# Patient Record
Sex: FEMALE | Race: WHITE | Employment: PART TIME | URBAN - METROPOLITAN AREA
[De-identification: names, ages, dates, MRNs, and addresses within clinical notes are randomized per-mention and may not be internally consistent; named-entity substitution may affect disease eponyms.]

---

## 2018-05-08 RX ORDER — MELATONIN
1000
COMMUNITY

## 2018-05-08 RX ORDER — TERAZOSIN 2 MG/1
4 CAPSULE ORAL
COMMUNITY

## 2018-05-08 RX ORDER — AMLODIPINE BESYLATE 2.5 MG/1
5 TABLET ORAL
COMMUNITY

## 2018-05-08 RX ORDER — LISINOPRIL AND HYDROCHLOROTHIAZIDE 12.5; 2 MG/1; MG/1
2 TABLET ORAL
COMMUNITY

## 2018-05-09 ENCOUNTER — ANESTHESIA (OUTPATIENT)
Dept: ENDOSCOPY | Age: 68
End: 2018-05-09
Payer: MEDICARE

## 2018-05-09 ENCOUNTER — HOSPITAL ENCOUNTER (OUTPATIENT)
Age: 68
Setting detail: OUTPATIENT SURGERY
Discharge: HOME | End: 2018-05-09
Attending: SPECIALIST | Admitting: SPECIALIST
Payer: MEDICARE

## 2018-05-09 ENCOUNTER — ANESTHESIA EVENT (OUTPATIENT)
Dept: ENDOSCOPY | Age: 68
End: 2018-05-09
Payer: MEDICARE

## 2018-05-09 VITALS
TEMPERATURE: 97.8 F | HEIGHT: 63 IN | BODY MASS INDEX: 24.63 KG/M2 | OXYGEN SATURATION: 100 % | WEIGHT: 139 LBS | DIASTOLIC BLOOD PRESSURE: 69 MMHG | RESPIRATION RATE: 12 BRPM | HEART RATE: 59 BPM | SYSTOLIC BLOOD PRESSURE: 100 MMHG

## 2018-05-09 PROCEDURE — 76060000032 HC ANESTHESIA 0.5 TO 1 HR: Performed by: SPECIALIST

## 2018-05-09 PROCEDURE — 74011000258 HC RX REV CODE- 258

## 2018-05-09 PROCEDURE — 74011250636 HC RX REV CODE- 250/636

## 2018-05-09 PROCEDURE — 77030009426 HC FCPS BIOP ENDOSC BSC -B: Performed by: SPECIALIST

## 2018-05-09 PROCEDURE — 76040000007: Performed by: SPECIALIST

## 2018-05-09 PROCEDURE — 88305 TISSUE EXAM BY PATHOLOGIST: CPT | Performed by: SPECIALIST

## 2018-05-09 PROCEDURE — 77030027957 HC TBNG IRR ENDOGTR BUSS -B: Performed by: SPECIALIST

## 2018-05-09 PROCEDURE — 74011000250 HC RX REV CODE- 250

## 2018-05-09 RX ORDER — FENTANYL CITRATE 50 UG/ML
200 INJECTION, SOLUTION INTRAMUSCULAR; INTRAVENOUS
Status: DISCONTINUED | OUTPATIENT
Start: 2018-05-09 | End: 2018-05-09 | Stop reason: HOSPADM

## 2018-05-09 RX ORDER — LORAZEPAM 2 MG/ML
2 INJECTION INTRAMUSCULAR AS NEEDED
Status: DISCONTINUED | OUTPATIENT
Start: 2018-05-09 | End: 2018-05-09 | Stop reason: HOSPADM

## 2018-05-09 RX ORDER — SODIUM CHLORIDE 0.9 % (FLUSH) 0.9 %
5-10 SYRINGE (ML) INJECTION AS NEEDED
Status: DISCONTINUED | OUTPATIENT
Start: 2018-05-09 | End: 2018-05-09 | Stop reason: HOSPADM

## 2018-05-09 RX ORDER — EPINEPHRINE 0.1 MG/ML
1 INJECTION INTRACARDIAC; INTRAVENOUS
Status: DISCONTINUED | OUTPATIENT
Start: 2018-05-09 | End: 2018-05-09 | Stop reason: HOSPADM

## 2018-05-09 RX ORDER — MIDAZOLAM HYDROCHLORIDE 1 MG/ML
.25-1 INJECTION, SOLUTION INTRAMUSCULAR; INTRAVENOUS
Status: DISCONTINUED | OUTPATIENT
Start: 2018-05-09 | End: 2018-05-09 | Stop reason: HOSPADM

## 2018-05-09 RX ORDER — NALOXONE HYDROCHLORIDE 0.4 MG/ML
0.4 INJECTION, SOLUTION INTRAMUSCULAR; INTRAVENOUS; SUBCUTANEOUS
Status: DISCONTINUED | OUTPATIENT
Start: 2018-05-09 | End: 2018-05-09 | Stop reason: HOSPADM

## 2018-05-09 RX ORDER — LIDOCAINE HYDROCHLORIDE 20 MG/ML
INJECTION, SOLUTION EPIDURAL; INFILTRATION; INTRACAUDAL; PERINEURAL AS NEEDED
Status: DISCONTINUED | OUTPATIENT
Start: 2018-05-09 | End: 2018-05-09 | Stop reason: HOSPADM

## 2018-05-09 RX ORDER — SODIUM CHLORIDE 0.9 % (FLUSH) 0.9 %
5-10 SYRINGE (ML) INJECTION EVERY 8 HOURS
Status: DISCONTINUED | OUTPATIENT
Start: 2018-05-09 | End: 2018-05-09 | Stop reason: HOSPADM

## 2018-05-09 RX ORDER — GLYCOPYRROLATE 0.2 MG/ML
INJECTION INTRAMUSCULAR; INTRAVENOUS AS NEEDED
Status: DISCONTINUED | OUTPATIENT
Start: 2018-05-09 | End: 2018-05-09 | Stop reason: HOSPADM

## 2018-05-09 RX ORDER — DEXTROMETHORPHAN/PSEUDOEPHED 2.5-7.5/.8
1.2 DROPS ORAL
Status: DISCONTINUED | OUTPATIENT
Start: 2018-05-09 | End: 2018-05-09 | Stop reason: HOSPADM

## 2018-05-09 RX ORDER — SODIUM CHLORIDE 9 MG/ML
INJECTION, SOLUTION INTRAVENOUS
Status: DISCONTINUED | OUTPATIENT
Start: 2018-05-09 | End: 2018-05-09 | Stop reason: HOSPADM

## 2018-05-09 RX ORDER — FLUMAZENIL 0.1 MG/ML
0.2 INJECTION INTRAVENOUS
Status: DISCONTINUED | OUTPATIENT
Start: 2018-05-09 | End: 2018-05-09 | Stop reason: HOSPADM

## 2018-05-09 RX ORDER — SODIUM CHLORIDE 9 MG/ML
50 INJECTION, SOLUTION INTRAVENOUS CONTINUOUS
Status: DISCONTINUED | OUTPATIENT
Start: 2018-05-09 | End: 2018-05-09 | Stop reason: HOSPADM

## 2018-05-09 RX ORDER — ATROPINE SULFATE 0.1 MG/ML
0.5 INJECTION INTRAVENOUS
Status: DISCONTINUED | OUTPATIENT
Start: 2018-05-09 | End: 2018-05-09 | Stop reason: HOSPADM

## 2018-05-09 RX ORDER — PROPOFOL 10 MG/ML
INJECTION, EMULSION INTRAVENOUS AS NEEDED
Status: DISCONTINUED | OUTPATIENT
Start: 2018-05-09 | End: 2018-05-09 | Stop reason: HOSPADM

## 2018-05-09 RX ADMIN — PROPOFOL 25 MG: 10 INJECTION, EMULSION INTRAVENOUS at 10:00

## 2018-05-09 RX ADMIN — PROPOFOL 25 MG: 10 INJECTION, EMULSION INTRAVENOUS at 09:56

## 2018-05-09 RX ADMIN — GLYCOPYRROLATE 0.2 MG: 0.2 INJECTION INTRAMUSCULAR; INTRAVENOUS at 09:45

## 2018-05-09 RX ADMIN — PROPOFOL 100 MG: 10 INJECTION, EMULSION INTRAVENOUS at 09:43

## 2018-05-09 RX ADMIN — PROPOFOL 25 MG: 10 INJECTION, EMULSION INTRAVENOUS at 09:44

## 2018-05-09 RX ADMIN — SODIUM CHLORIDE: 9 INJECTION, SOLUTION INTRAVENOUS at 09:38

## 2018-05-09 RX ADMIN — PROPOFOL 25 MG: 10 INJECTION, EMULSION INTRAVENOUS at 09:50

## 2018-05-09 RX ADMIN — PROPOFOL 25 MG: 10 INJECTION, EMULSION INTRAVENOUS at 10:05

## 2018-05-09 RX ADMIN — LIDOCAINE HYDROCHLORIDE 100 MG: 20 INJECTION, SOLUTION EPIDURAL; INFILTRATION; INTRACAUDAL; PERINEURAL at 09:45

## 2018-05-09 RX ADMIN — PROPOFOL 25 MG: 10 INJECTION, EMULSION INTRAVENOUS at 09:53

## 2018-05-09 NOTE — ROUTINE PROCESS
Daniel Henderson  1950  [id number]    Situation:  Verbal report received from: Ashanti Duarte RN  Procedure: Procedure(s):  COLONOSCOPY  ENDOSCOPIC POLYPECTOMY    Background:    Preoperative diagnosis: SCREENING  Postoperative diagnosis: 1.- Mild Diverticulosis  2.- Colon Polyps    :  Dr. Cristian Logan  Assistant(s): Endoscopy Technician-1: Ken Bhagat  Endoscopy RN-1: Jeanne Pedersen RN    Specimens:   ID Type Source Tests Collected by Time Destination   1 : Rectal Polyp Preservative   Mode Shepherd MD 5/9/2018 6926 Pathology   2 : Sigmoid Polyp Preservative   Mode Shepherd MD 5/9/2018 1007 Pathology     H. Pylori  no    Assessment:  Intra-procedure medications       Anesthesia gave intra-procedure sedation and medications, see anesthesia flow sheet yes    Intravenous fluids: NS@ KVO     Vital signs stable     Abdominal assessment: round and soft     Recommendation:  Discharge patient per MD order  Family or Friend Pt   Permission to share finding with family or friend yes

## 2018-05-09 NOTE — IP AVS SNAPSHOT
[address] 
[phone] Patient: Leatha Us MRN: [id number] About your hospitalization You were admitted on:  May 9, 2018 You last received care in the:  [facility] ENDOSCOPY You were discharged on:  May 9, 2018 Why you were hospitalized Your primary diagnosis was:  Not on File Follow-up Information None Discharge Orders None A check yg indicates which time of day the medication should be taken. My Medications CONTINUE taking these medications Instructions Each Dose to Equal  
 Morning Noon Evening Bedtime  
 amLODIPine 2.5 mg tablet Commonly known as:  Thomas Dumas Your last dose was: Your next dose is: Take 5 mg by mouth nightly. 5 mg  
    
   
   
   
  
 lisinopril-hydroCHLOROthiazide 20-12.5 mg per tablet Commonly known as:  Aida Dun Your last dose was: Your next dose is: Take 2 Tabs by mouth nightly. 2 Tab  
    
   
   
   
  
 terazosin 2 mg capsule Commonly known as:  HYTRIN Your last dose was: Your next dose is: Take 4 mg by mouth nightly. 4 mg VITAMIN D3 1,000 unit tablet Generic drug:  cholecalciferol Your last dose was: Your next dose is: Take 1,000 Units by mouth nightly. 1000 Units Discharge Instructions Leatha Us 
[id number] 
1950 COLON DISCHARGE INSTRUCTIONS Discomfort: 
Redness at IV site- apply warm compress to area; if redness or soreness persist- contact your physician There may be a slight amount of blood passed from the rectum Gaseous discomfort- walking, belching will help relieve any discomfort You may not operate a vehicle for 12 hours You may not engage in an occupation involving machinery or appliances for rest of today You may not drink alcoholic beverages for at least 12 hours Avoid making any critical decisions for at least 24 hour DIET: 
 Regular diet.  however -  remember your colon is empty and a heavy meal will produce gas. Avoid these foods:  vegetables, fried / greasy foods, carbonated drinks for today. ACTIVITY: 
You may resume your normal daily activities it is recommended that you spend the remainder of the day resting -  avoid any strenuous activity. CALL M.D. ANY SIGN OF: Increasing pain, nausea, vomiting Abdominal distension (swelling) New increased bleeding (oral or rectal) Fever (chills) Pain in chest area Bloody discharge from nose or mouth Shortness of breath You may not  take any Advil, Aspirin, Ibuprofen, Motrin, Aleve, Goodys, or any similar pain or arthritis products for 3 days, ONLY  Tylenol as needed for pain. Follow-up Instructions: 
 Call Dr. Gilbert Snow Results of procedure / biopsy in 10-14days Office telephone for problems or questions [phone] 
 
  - If adenoma is present, repeat colonoscopy in 5 years. - If < 10 years, reason: above average risk patient Introducing [facility]! Adelina Camejo introduces [employer] patient portal. Now you can access parts of your medical record, email your doctor's office, and request medication refills online. 1. In your internet browser, go to [email or web address] 2. Click on the First Time User? Click Here link in the Sign In box. You will see the New Member Sign Up page. 3. Enter your [employer] Access Code exactly as it appears below. You will not need to use this code after youve completed the sign-up process. If you do not sign up before the expiration date, you must request a new code. · [employer] Access Code: [email or web address] Expires: 8/6/2018  4:41 PM 
 
4.  Enter the last four digits of your Social Security Number (xxxx) and Date of Birth (mm/dd/yyyy) as indicated and click Submit. You will be taken to the next sign-up page. 5. Create a [employer] ID. This will be your Vo[employer] login ID and cannot be changed, so think of one that is secure and easy to remember. 6. Create a [employer] password. You can change your password at any time. 7. Enter your Password Reset Question and Answer. This can be used at a later time if you forget your password. 8. Enter your e-mail address. You will receive e-mail notification when new information is available in [address] 9. Click Sign Up. You can now view and download portions of your medical record. 10. Click the Download Summary menu link to download a portable copy of your medical information. If you have questions, please visit the Frequently Asked Questions section of the Vo[employer] website. Remember, Voices is NOT to be used for urgent needs. For medical emergencies, dial 911. Now available from your iPhone and Android! Introducing Joseph Braden As a [employer] patient, I wanted to make you aware of our electronic visit tool called Joseph Braden. [employer] 24/7 allows you to connect within minutes with a medical provider 24 hours a day, seven days a week via a mobile device or tablet or logging into a secure website from your computer. You can access Joseph Braden from anywhere in the [place]. A virtual visit might be right for you when you have a simple condition and feel like you just dont want to get out of bed, or cant get away from work for an appointment, when your regular [employer] provider is not available (evenings, weekends or holidays), or when youre out of town and need minor care. Electronic visits cost only $49 and if the [employer] 24/7 provider determines a prescription is needed to treat your condition, one can be electronically transmitted to a nearby pharmacy*. Please take a moment to enroll today if you have not already done so. The enrollment process is free and takes just a few minutes. To enroll, please download the [employer] 24/7 estephanie to your tablet or phone, or visit [email or web address] to enroll on your computer. And, as an [address] [place] [address] patient with a [employer] account, the results of your visits will be scanned into your electronic medical record and your primary care provider will be able to view the scanned results. We urge you to continue to see your regular [employer] provider for your ongoing medical care. And while your primary care provider may not be the one available when you seek a [employer] virtual visit, the peace of mind you get from getting a real diagnosis real time can be priceless. For more information on [employer], view our Frequently Asked Questions (FAQs) at [email or web address]. Sincerely, 
 
Mike De Jesus MD 
Chief Medical Officer [facility] Whitney Mcbride *:  certain medications cannot be prescribed via [employer] Providers Seen During Your Hospitalization Provider Specialty Primary office phone Adama Trammell MD Gastroenterology [phone] Your Primary Care Physician (PCP) Primary Care Physician Office Phone Office Fax Tata Lucia [phone] You are allergic to the following No active allergies Recent Documentation Height Weight BMI OB Status Smoking Status 1.6 m 63 kg 24.62 kg/m2 Postmenopausal Never Smoker Emergency Contacts Name Discharge Info Relation Home Work Mobile Ki Potts DISCHARGE CAREGIVER [3] Spouse [3] [phone] Patient Belongings The following personal items are in your possession at time of discharge: 
  Dental Appliances: None Please provide this summary of care documentation to your next provider. Signatures-by signing, you are acknowledging that this After Visit Summary has been reviewed with you and you have received a copy. Patient Signature:  ____________________________________________________________ Date:  ____________________________________________________________  
  
Larri Hazard Provider Signature:  ____________________________________________________________ Date:  ____________________________________________________________

## 2018-05-09 NOTE — H&P
Pre-endoscopy H and P for Colonoscopy    The patient was seen and examined. Date of last colonoscopy: 2007, Polyps  No      The airway was assessed and documented. The problem list, past medical history, and medications were reviewed. There is no problem list on file for this patient. Social History     Social History    Marital status:      Spouse name: N/A    Number of children: N/A    Years of education: N/A     Occupational History    Not on file. Social History Main Topics    Smoking status: Never Smoker    Smokeless tobacco: Never Used    Alcohol use Yes      Comment: minimal    Drug use: Not on file    Sexual activity: Not on file     Other Topics Concern    Not on file     Social History Narrative    No narrative on file     Past Medical History:   Diagnosis Date    Hypertension      The patient has a family history of na    Cannot display prior to admission medications because the patient has not been admitted in this contact. The review of systems is:  negative for shortness of breath or chest pain      The heart, lungs and mental status were satisfactory for the administration of MAC sedation and for the procedure. Mallampati score: See Anesthesia. I discussed with the patient the objectives, risks, consequences and alternatives to the procedure. Plan: Endoscopic procedure with MAC sedation.     Jordyn Molina MD  5/9/2018  8:45 AM

## 2018-05-09 NOTE — PROCEDURES
Colonoscopy Procedure Note    Indications:   Screening colonoscopy  Referring Physician: Monika Harris MD   Anesthesia/Sedation:MAC  Endoscopist:  Dr. Janette Fall  Assistant:  Endoscopy Technician-1: Radha Coyle  Endoscopy RN-1: Mirella Fisher RN    Preoperative diagnosis: SCREENING    Postoperative diagnosis: 1.- Mild Diverticulosis  2.- Colon Polyps      Procedure in Detail:  Informed consent was obtained for the procedure, including sedation. Risks of perforation, hemorrhage, adverse drug reaction, and aspiration were discussed. The patient was placed in the left lateral decubitus position. Based on the pre-procedure assessment, including review of the patient's medical history, medications, allergies, and review of systems, she had been deemed to be an appropriate candidate for moderate sedation; she was therefore sedated with the medications listed above. The patient was monitored continuously with ECG tracing, pulse oximetry, blood pressure monitoring, and direct observations. A rectal examination was performed. The [id number] was inserted into the rectum and advanced under direct vision to the terminal ileum. The quality of the colonic preparation was excellent. A careful inspection was made as the colonoscope was withdrawn, including a retroflexed view of the rectum; findings and interventions are described below. Findings:   Rectum: 2 mm polyp removed with cold forceps  Sigmoid: moderate diverticulosis; 2 mm polyp removed with cold forceps  Descending Colon: mild diverticulosis;  Transverse Colon: mild diverticulosis; Ascending Colon: normal  Cecum: normal  Terminal Ileum: normal    Specimens:     see above    EBL: None    Complications: None; patient tolerated the procedure well. Recommendations:     - If adenoma is present, repeat colonoscopy in 5 years.      - If < 10 years, reason: above average risk patient    Signed By: Belkis Rogers MD May 9, 2018

## 2018-05-09 NOTE — DISCHARGE INSTRUCTIONS
Patrice Spaulding  [id number]  1950    COLON DISCHARGE INSTRUCTIONS  Discomfort:  Redness at IV site- apply warm compress to area; if redness or soreness persist- contact your physician  There may be a slight amount of blood passed from the rectum  Gaseous discomfort- walking, belching will help relieve any discomfort  You may not operate a vehicle for 12 hours  You may not engage in an occupation involving machinery or appliances for rest of today  You may not drink alcoholic beverages for at least 12 hours  Avoid making any critical decisions for at least 24 hour  DIET:   Regular diet. - however -  remember your colon is empty and a heavy meal will produce gas. Avoid these foods:  vegetables, fried / greasy foods, carbonated drinks for today. ACTIVITY:  You may resume your normal daily activities it is recommended that you spend the remainder of the day resting -  avoid any strenuous activity. CALL M.D. ANY SIGN OF:  Increasing pain, nausea, vomiting  Abdominal distension (swelling)  New increased bleeding (oral or rectal)  Fever (chills)  Pain in chest area  Bloody discharge from nose or mouth  Shortness of breath    You may not  take any Advil, Aspirin, Ibuprofen, Motrin, Aleve, Goodys, or any similar pain or arthritis products for 3 days, ONLY  Tylenol as needed for pain. Follow-up Instructions:   Call Dr. Ayde Brown  Results of procedure / biopsy in 10-14days   Office telephone for problems or questions [phone]      - If adenoma is present, repeat colonoscopy in 5 years.      - If < 10 years, reason: above average risk patient

## 2018-05-09 NOTE — PROGRESS NOTES

## 2021-07-23 ENCOUNTER — HOSPITAL ENCOUNTER (OUTPATIENT)
Age: 71
Setting detail: OUTPATIENT SURGERY
Discharge: HOME | End: 2021-07-23
Attending: INTERNAL MEDICINE | Admitting: INTERNAL MEDICINE
Payer: MEDICARE

## 2021-07-23 ENCOUNTER — ANESTHESIA EVENT (OUTPATIENT)
Dept: ENDOSCOPY | Age: 71
End: 2021-07-23
Payer: MEDICARE

## 2021-07-23 ENCOUNTER — ANESTHESIA (OUTPATIENT)
Dept: ENDOSCOPY | Age: 71
End: 2021-07-23
Payer: MEDICARE

## 2021-07-23 VITALS
DIASTOLIC BLOOD PRESSURE: 120 MMHG | BODY MASS INDEX: 24.8 KG/M2 | OXYGEN SATURATION: 93 % | WEIGHT: 140 LBS | RESPIRATION RATE: 18 BRPM | HEIGHT: 63 IN | HEART RATE: 67 BPM | TEMPERATURE: 96.9 F | SYSTOLIC BLOOD PRESSURE: 144 MMHG

## 2021-07-23 PROCEDURE — 77030018712 HC DEV BLLN INFL BSC -B: Performed by: INTERNAL MEDICINE

## 2021-07-23 PROCEDURE — 2709999900 HC NON-CHARGEABLE SUPPLY: Performed by: INTERNAL MEDICINE

## 2021-07-23 PROCEDURE — 74011250636 HC RX REV CODE- 250/636: Performed by: NURSE ANESTHETIST, CERTIFIED REGISTERED

## 2021-07-23 PROCEDURE — C1726 CATH, BAL DIL, NON-VASCULAR: HCPCS | Performed by: INTERNAL MEDICINE

## 2021-07-23 PROCEDURE — 76040000019: Performed by: INTERNAL MEDICINE

## 2021-07-23 PROCEDURE — 76060000031 HC ANESTHESIA FIRST 0.5 HR: Performed by: INTERNAL MEDICINE

## 2021-07-23 PROCEDURE — 74011000250 HC RX REV CODE- 250: Performed by: NURSE ANESTHETIST, CERTIFIED REGISTERED

## 2021-07-23 RX ORDER — FLUMAZENIL 0.1 MG/ML
0.2 INJECTION INTRAVENOUS
Status: DISCONTINUED | OUTPATIENT
Start: 2021-07-23 | End: 2021-07-23 | Stop reason: HOSPADM

## 2021-07-23 RX ORDER — ATROPINE SULFATE 0.1 MG/ML
0.5 INJECTION INTRAVENOUS
Status: DISCONTINUED | OUTPATIENT
Start: 2021-07-23 | End: 2021-07-23 | Stop reason: HOSPADM

## 2021-07-23 RX ORDER — EPINEPHRINE 0.1 MG/ML
1 INJECTION INTRACARDIAC; INTRAVENOUS
Status: DISCONTINUED | OUTPATIENT
Start: 2021-07-23 | End: 2021-07-23 | Stop reason: HOSPADM

## 2021-07-23 RX ORDER — PROPOFOL 10 MG/ML
INJECTION, EMULSION INTRAVENOUS AS NEEDED
Status: DISCONTINUED | OUTPATIENT
Start: 2021-07-23 | End: 2021-07-23 | Stop reason: HOSPADM

## 2021-07-23 RX ORDER — FENTANYL CITRATE 50 UG/ML
100 INJECTION, SOLUTION INTRAMUSCULAR; INTRAVENOUS
Status: DISCONTINUED | OUTPATIENT
Start: 2021-07-23 | End: 2021-07-23 | Stop reason: HOSPADM

## 2021-07-23 RX ORDER — MIDAZOLAM HYDROCHLORIDE 1 MG/ML
.25-1 INJECTION, SOLUTION INTRAMUSCULAR; INTRAVENOUS
Status: DISCONTINUED | OUTPATIENT
Start: 2021-07-23 | End: 2021-07-23 | Stop reason: HOSPADM

## 2021-07-23 RX ORDER — OMEPRAZOLE 20 MG/1
20 CAPSULE, DELAYED RELEASE ORAL DAILY
COMMUNITY

## 2021-07-23 RX ORDER — SODIUM CHLORIDE 0.9 % (FLUSH) 0.9 %
5-40 SYRINGE (ML) INJECTION EVERY 8 HOURS
Status: DISCONTINUED | OUTPATIENT
Start: 2021-07-23 | End: 2021-07-23 | Stop reason: HOSPADM

## 2021-07-23 RX ORDER — SODIUM CHLORIDE 9 MG/ML
INJECTION, SOLUTION INTRAVENOUS
Status: DISCONTINUED | OUTPATIENT
Start: 2021-07-23 | End: 2021-07-23 | Stop reason: HOSPADM

## 2021-07-23 RX ORDER — SODIUM CHLORIDE 9 MG/ML
50 INJECTION, SOLUTION INTRAVENOUS CONTINUOUS
Status: DISCONTINUED | OUTPATIENT
Start: 2021-07-23 | End: 2021-07-23 | Stop reason: HOSPADM

## 2021-07-23 RX ORDER — LIDOCAINE HYDROCHLORIDE 20 MG/ML
INJECTION, SOLUTION EPIDURAL; INFILTRATION; INTRACAUDAL; PERINEURAL AS NEEDED
Status: DISCONTINUED | OUTPATIENT
Start: 2021-07-23 | End: 2021-07-23 | Stop reason: HOSPADM

## 2021-07-23 RX ORDER — DEXTROMETHORPHAN/PSEUDOEPHED 2.5-7.5/.8
1.2 DROPS ORAL
Status: DISCONTINUED | OUTPATIENT
Start: 2021-07-23 | End: 2021-07-23 | Stop reason: HOSPADM

## 2021-07-23 RX ORDER — SODIUM CHLORIDE 0.9 % (FLUSH) 0.9 %
5-40 SYRINGE (ML) INJECTION AS NEEDED
Status: DISCONTINUED | OUTPATIENT
Start: 2021-07-23 | End: 2021-07-23 | Stop reason: HOSPADM

## 2021-07-23 RX ORDER — NALOXONE HYDROCHLORIDE 0.4 MG/ML
0.4 INJECTION, SOLUTION INTRAMUSCULAR; INTRAVENOUS; SUBCUTANEOUS
Status: DISCONTINUED | OUTPATIENT
Start: 2021-07-23 | End: 2021-07-23 | Stop reason: HOSPADM

## 2021-07-23 RX ADMIN — LIDOCAINE HYDROCHLORIDE 40 MG: 20 INJECTION, SOLUTION EPIDURAL; INFILTRATION; INTRACAUDAL; PERINEURAL at 15:28

## 2021-07-23 RX ADMIN — PROPOFOL 50 MG: 10 INJECTION, EMULSION INTRAVENOUS at 15:32

## 2021-07-23 RX ADMIN — PROPOFOL 50 MG: 10 INJECTION, EMULSION INTRAVENOUS at 15:28

## 2021-07-23 RX ADMIN — SODIUM CHLORIDE: 900 INJECTION, SOLUTION INTRAVENOUS at 15:15

## 2021-07-23 RX ADMIN — PROPOFOL 30 MG: 10 INJECTION, EMULSION INTRAVENOUS at 15:34

## 2021-07-23 RX ADMIN — PROPOFOL 50 MG: 10 INJECTION, EMULSION INTRAVENOUS at 15:29

## 2021-07-23 NOTE — PROCEDURES
[address]  [address]  [place], [address]  [phone]                            NAME:  Mel Aranda   :   1950   MRN:   [id number]     Date/Time:  2021 3:44 PM    Esophagogastroduodenoscopy (EGD) Procedure Note    :  Anisha Summers MD    Staff: Endoscopy Technician-1: [facility]  Endoscopy RN-1: Roger Gomez RN    Referring Provider:  Eulis Schilder., MD    Anethesia/Sedation:  MAC anesthesia    Procedure Details   After infomed consent was obtained for the procedure, with all risks and benefits of procedure explained the patient was taken to the endoscopy suite and placed in the left lateral decubitus position. Following sequential administration of sedation as per above, the gastroscope was inserted into the mouth and advanced under direct vision to second portion of the duodenum. A careful inspection was made as the gastroscope was withdrawn, including a retroflexed view of the proximal stomach; findings and interventions are described below. Findings:  Esophagus:normal esophageal mucosa, non-obstructing Shatzki's ring at GE junction 36 cm from the incisors, dilated 16.5 mm-18 mm with balloon dilator. 3 cm sliding hiatal hernia  Stomach:normal   Duodenum/jejunum:normal    Interventions:  Dilation            Specimens Removed:  * No specimens in log *    Complications: None. EBL:  Minimal     Impression:    See Postoperative diagnosis above    Recommendations:   - Resume normal medications. Continue omeprazole 20 mg once daily.      Discharge disposition:  Home in the company of  when able to ambulate    Anisha Summers MD

## 2021-07-23 NOTE — H&P
[address]  [address] [facility] [address] [place], [address]  [phone]                                History and Physical     NAME: Stella Finney   :  1950   MRN:  [id number]     HPI:  The patient was seen and examined. Past Surgical History:   Procedure Laterality Date    COLONOSCOPY N/A 2018    COLONOSCOPY performed by Lucía Jones MD at [facility] ENDOSCOPY    HX MASTECTOMY      bilateral - not cancerous - done d/t family hx    HX ORTHOPAEDIC      surgery for broken left ankle     Past Medical History:   Diagnosis Date    Hypertension      Social History     Tobacco Use    Smoking status: Never Smoker    Smokeless tobacco: Never Used   Vaping Use    Vaping Use: Never used   Substance Use Topics    Alcohol use: Yes     Comment: minimal    Drug use: Never     No Known Allergies  Family History   Problem Relation Age of Onset    Breast Cancer Mother     Breast Cancer Maternal Aunt     Breast Cancer Maternal Grandmother     Breast Cancer Other         first cousins x 1     No current facility-administered medications for this encounter. PHYSICAL EXAM:  General: WD, WN. Alert, cooperative, no acute distress    HEENT: NC, Atraumatic. PERRLA, EOMI. Anicteric sclerae. Lungs:  CTA Bilaterally. No Wheezing/Rhonchi/Rales. Heart:  Regular  rhythm,  No murmur, No Rubs, No Gallops  Abdomen: Soft, Non distended, Non tender. +Bowel sounds, no HSM  Extremities: No c/c/e  Neurologic:  CN 2-12 gi, Alert and oriented X 3. No acute neurological distress   Psych:   Good insight. Not anxious nor agitated. The heart, lungs and mental status were satisfactory for the administration of MAC sedation and for the procedure. Mallampati score: 2     The patient was counseled at length about the risks of aure Covid-19 in the yannick-operative and post-operative states including the recovery window of their procedure.   The patient was made aware that aure Covid-19 after a surgical procedure may worsen their prognosis for recovering from the virus and lend to a higher morbidity and or mortality risk. The patient was given the options of postponing their procedure. All of the risks, benefits, and alternatives were discussed. The patient does wish to proceed with the procedure.       Assessment:   · dysphagia    Plan:   · Endoscopic procedure :egd  · MAC sedation

## 2021-07-23 NOTE — DISCHARGE INSTRUCTIONS
[address] [facility].  [address]  [id number]  1950    It was my pleasure seeing you for your procedure. You will also receive a summary report with the findings from this procedure and any further recommendations. If you had polyps removed or biopsies taken during your procedure, you will receive a separate letter from me within the next 2 weeks. If you don't receive this letter or if you have any questions, please call my office [phone]. Please take note of the post procedure instructions listed below. Braulio Lee,    Dr. Randy Araujo    These instructions give you information on caring for yourself after your procedure. Call your doctor if you have any problems or questions after your procedure. HOME CARE  · Walk if you have belly cramping or gas. Walking will help get rid of the air and reduce the bloated feeling in your belly (abdomen). · Your IV site (where you received drugs) may be tender to touch. Place warm towels on the site; keep your arm up on two pillows if you have any swelling or soreness in the area. · You may shower. ACTIVITY:  · Take frequent rest periods and move at a slower pace for the next 24 hours. .  · You may resume your regular activity tomorrow if you are feeling back to normal.  · Do not drive or ride a bicycle for at least 24 hours (because of the medicine (anesthesia) used during the test). · Do not sign any important legal documents or use or operate any machinery for 24 hours  · Do not take sleeping medicines/nerve drugs for 24 hours unless the doctor tells you. · You can return to work/school tomorrow unless otherwise instructed. NUTRITION:  · Drink plenty of fluids to keep your pee (urine) clear or pale yellow  · Begin with a light meal and progress to your normal diet.  Heavy or fried foods are harder to digest and may make you feel sick to your stomach (nauseated). · Once you are feeling back to normal, you may resume your normal diet as instructed by your doctor. · Avoid alcoholic beverages for 24 hours or as instructed. IF YOU HAD BIOPSIES TAKEN OR POLYPS REMOVED DURING THE PROCEDURE:  · For the next 7 days, avoid all non-steroidal antiinflammatory medications such as Ibuprofen, Motrin, Advil, Alleve, Belinda-seltzer, Goody's powder, BC powder. · If you do not have an heart condition that requires you to take a daily aspirin, you should avoid taking aspirin for 7 days. · Eat a soft diet for 24 hours. · Monitor your stools for any blood or dark black, tar-like, stools as this may be a sign of bleeding and if you see any blood, notify your doctor immediately. GET HELP RIGHT AWAY AND SEEK IMMEDIATE MEDICAL CARE IF:  · You have more than a spotting of blood in your stool. · You pass clumps of tissue (blood clots) or fill the toilet with blood. · Your belly is painfully swollen or puffy (abdominal distention). · You throw up (vomit). · You have a fever. · You have redness, pain or swelling at the IV site that last greater than two days. · You have abdominal pain or discomfort that is severe or gets worse throughout the day. Post-procedure diagnosis:  Hiatal Hernia   Schatzki's Ring    Post-procedure recommendations:    below. Findings:  Esophagus:normal esophageal mucosa, non-obstructing Shatzki's ring at GE junction 36 cm from the incisors, dilated 16.5 mm-18 mm with balloon dilator. 3 cm sliding hiatal hernia  Stomach:normal   Duodenum/jejunum:normal    Recommendations:   - Resume normal medications. Continue omeprazole 20 mg once daily. Learning About Coronavirus [phone])  Coronavirus [phone]): Overview  What is coronavirus (COVID-19)? The coronavirus disease (COVID-19) is caused by a virus. It is an illness that was first found in [place], [place], in December 2019.  It has since spread worldwide. The virus can cause fever, cough, and trouble breathing. In severe cases, it can cause pneumonia and make it hard to breathe without help. It can cause death. Coronaviruses are a large group of viruses. They cause the common cold. They also cause more serious illnesses like Middle East respiratory syndrome (MERS) and severe acute respiratory syndrome (SARS). COVID-19 is caused by a novel coronavirus. That means it's a new type that has not been seen in people before. This virus spreads person-to-person through droplets from coughing and sneezing. It can also spread when you are close to someone who is infected. And it can spread when you touch something that has the virus on it, such as a doorknob or a tabletop. What can you do to protect yourself from coronavirus (COVID-19)? The best way to protect yourself from getting sick is to:  · Avoid areas where there is an outbreak. · Avoid contact with people who may be infected. · Wash your hands often with soap or alcohol-based hand sanitizers. · Avoid crowds and try to stay at least 6 feet away from other people. · Wash your hands often, especially after you cough or sneeze. Use soap and water, and scrub for at least 20 seconds. If soap and water aren't available, use an alcohol-based hand . · Avoid touching your mouth, nose, and eyes. What can you do to avoid spreading the virus to others? To help avoid spreading the virus to others:  · Cover your mouth with a tissue when you cough or sneeze. Then throw the tissue in the trash. · Use a disinfectant to clean things that you touch often. · Stay home if you are sick or have been exposed to the virus. Don't go to school, work, or public areas. And don't use public transportation. · If you are sick:  ? Leave your home only if you need to get medical care. But call the doctor's office first so they know you're coming.  And wear a face mask, if you have one.  ? If you have a face mask, wear it whenever you're around other people. It can help stop the spread of the virus when you cough or sneeze. ? Clean and disinfect your home every day. Use household  and disinfectant wipes or sprays. Take special care to clean things that you grab with your hands. These include doorknobs, remote controls, phones, and handles on your refrigerator and microwave. And don't forget countertops, tabletops, bathrooms, and computer keyboards. When to call for help  Call 911 anytime you think you may need emergency care. For example, call if:  · You have severe trouble breathing. (You can't talk at all.)  · You have constant chest pain or pressure. · You are severely dizzy or lightheaded. · You are confused or can't think clearly. · Your face and lips have a blue color. · You pass out (lose consciousness) or are very hard to wake up. Call your doctor now if you develop symptoms such as:  · Shortness of breath. · Fever. · Cough. If you need to get care, call ahead to the doctor's office for instructions before you go. Make sure you wear a face mask, if you have one, to prevent exposing other people to the virus. Where can you get the latest information? The following health organizations are tracking and studying this virus. Their websites contain the most up-to-date information. Jennifer Adrian also learn what to do if you think you may have been exposed to the virus. · [place]. Centers for Disease Control and Prevention (CDC): The CDC provides updated news about the disease and travel advice. The website also tells you how to prevent the spread of infection. www.cdc.gov  · World Health Organization [facility]): WHO offers information about the virus outbreaks. WHO also has travel advice. www.who.int  Current as of: April 1, 2020               Content Version: 12.4  © 8468-3807 [employer].    Care instructions adapted under license by your healthcare professional. If you have questions about a medical condition or this instruction, always ask your healthcare professional. [place] [address] any warranty or liability for your use of this information.

## 2021-07-23 NOTE — ANESTHESIA POSTPROCEDURE EVALUATION
Procedure(s):  UPPER ENDOSCOPY  ESOPHAGEAL DILATION. MAC    <BSHSIANPOST>    INITIAL Post-op Vital signs: No vitals data found for the desired time range.

## 2021-07-23 NOTE — ANESTHESIA PREPROCEDURE EVALUATION
Relevant Problems   No relevant active problems       Anesthetic History               Review of Systems / Medical History      Pulmonary                   Neuro/Psych              Cardiovascular    Hypertension                   GI/Hepatic/Renal                Endo/Other             Other Findings              Physical Exam    Airway  Mallampati: II           Cardiovascular               Dental         Pulmonary                 Abdominal         Other Findings            Anesthetic Plan    ASA: 2  Anesthesia type: MAC            Anesthetic plan and risks discussed with: Patient

## 2024-04-10 ENCOUNTER — HOSPITAL ENCOUNTER (OUTPATIENT)
Facility: HOSPITAL | Age: 74
Discharge: HOME | End: 2024-04-13

## 2024-04-10 DIAGNOSIS — E78.5 HYPERLIPIDEMIA LDL GOAL <100: ICD-10-CM

## 2024-04-10 DIAGNOSIS — M85.80 OTHER SPECIFIED DISORDERS OF BONE DENSITY AND STRUCTURE, UNSPECIFIED SITE: ICD-10-CM

## 2024-04-10 PROCEDURE — 75571 CT HRT W/O DYE W/CA TEST: CPT

## 2024-04-10 PROCEDURE — 77080 DXA BONE DENSITY AXIAL: CPT

## (undated) DEVICE — ESOPHAGEAL BALLOON DILATATION CATHETER: Brand: CRE FIXED WIRE

## (undated) DEVICE — FORCEPS BX L240CM JAW DIA2.8MM L CAP W/ NDL MIC MESH TOOTH

## (undated) DEVICE — QUILTED PREMIUM COMFORT UNDERPAD,EXTRA HEAVY: Brand: WINGS

## (undated) DEVICE — KENDALL RADIOLUCENT FOAM MONITORING ELECTRODE -RECTANGULAR SHAPE: Brand: KENDALL

## (undated) DEVICE — SYRINGE MED 20ML STD CLR PLAS LUERLOCK TIP N CTRL DISP

## (undated) DEVICE — AIRLIFE™ U/CONNECT-IT OXYGEN TUBING 7 FEET (2.1 M) CRUSH-RESISTANT OXYGEN TUBING, VINYL TIPPED: Brand: AIRLIFE™

## (undated) DEVICE — SYR ASSEMB INFL BLLN 60ML --

## (undated) DEVICE — BAG BELONG PT PERS CLEAR HANDL

## (undated) DEVICE — CATH IV AUTOGRD BC BLU 22GA 25 -- INSYTE

## (undated) DEVICE — Device

## (undated) DEVICE — SOLIDIFIER FLUID 3000 CC ABSORB

## (undated) DEVICE — CANN NASAL O2 CAPNOGRAPHY AD -- FILTERLINE

## (undated) DEVICE — BW-412T DISP COMBO CLEANING BRUSH: Brand: SINGLE USE COMBINATION CLEANING BRUSH

## (undated) DEVICE — NEONATAL-ADULT SPO2 SENSOR: Brand: NELLCOR

## (undated) DEVICE — Z DISCONTINUED NO SUB IDED SET EXTN W/ 4 W STPCOCK M SPIN LOK 36IN

## (undated) DEVICE — BAG SPEC BIOHZD LF 2MIL 6X10IN -- CONVERT TO ITEM 357326

## (undated) DEVICE — SET ADMIN 16ML TBNG L100IN 2 Y INJ SITE IV PIGGY BK DISP

## (undated) DEVICE — 1200 GUARD II KIT W/5MM TUBE W/O VAC TUBE: Brand: GUARDIAN

## (undated) DEVICE — TUBING HYDR IRR --

## (undated) DEVICE — Z DISCONTINUED USE 2751540 TUBING IRRIG L10IN DISP PMP ENDOGATOR

## (undated) DEVICE — KIT IV STRT W CHLORAPREP PD 1ML

## (undated) DEVICE — CONNECTOR TBNG AUX H2O JET DISP FOR OLY 160/180 SER

## (undated) DEVICE — CONTAINER SPEC 20 ML LID NEUT BUFF FORMALIN 10 % POLYPR STS

## (undated) DEVICE — ENDO CARRY-ON PROCEDURE KIT INCLUDES ENZYMATIC SPONGE, GAUZE, BIOHAZARD LABEL, TRAY, LUBRICANT, DIRTY SCOPE LABEL, WATER LABEL, TRAY, DRAWSTRING PAD, AND DEFENDO 4-PIECE KIT.: Brand: ENDO CARRY-ON PROCEDURE KIT

## (undated) DEVICE — NEEDLE HYPO 18GA L1.5IN PNK S STL HUB POLYPR SHLD REG BVL